# Patient Record
Sex: MALE | NOT HISPANIC OR LATINO | URBAN - METROPOLITAN AREA
[De-identification: names, ages, dates, MRNs, and addresses within clinical notes are randomized per-mention and may not be internally consistent; named-entity substitution may affect disease eponyms.]

---

## 2020-06-01 ENCOUNTER — OFFICE VISIT (OUTPATIENT)
Dept: URBAN - METROPOLITAN AREA CLINIC 39 | Facility: CLINIC | Age: 35
End: 2020-06-01
Payer: COMMERCIAL

## 2020-06-01 DIAGNOSIS — K50.80 CROHN'S DISEASE OF BOTH SMALL AND LARGE INTESTINE: ICD-10-CM

## 2020-06-01 PROCEDURE — 96413 CHEMO IV INFUSION 1 HR: CPT | Performed by: INTERNAL MEDICINE

## 2020-06-01 RX ORDER — VEDOLIZUMAB 300 MG/5ML
INFUSE 300 MG OVER 30 MINUTE(S) BY INTRAVENOUS ROUTE EVERY 8 WEEKS INJECTION, POWDER, LYOPHILIZED, FOR SOLUTION INTRAVENOUS
Qty: 1 | Refills: 0 | Status: ACTIVE | COMMUNITY
Start: 1900-01-01 | End: 1900-01-01

## 2020-07-28 ENCOUNTER — OFFICE VISIT (OUTPATIENT)
Dept: URBAN - METROPOLITAN AREA CLINIC 39 | Facility: CLINIC | Age: 35
End: 2020-07-28

## 2020-08-04 ENCOUNTER — OFFICE VISIT (OUTPATIENT)
Dept: URBAN - METROPOLITAN AREA CLINIC 97 | Facility: CLINIC | Age: 35
End: 2020-08-04

## 2020-08-04 ENCOUNTER — OFFICE VISIT (OUTPATIENT)
Dept: URBAN - METROPOLITAN AREA TELEHEALTH 2 | Facility: TELEHEALTH | Age: 35
End: 2020-08-04
Payer: COMMERCIAL

## 2020-08-04 DIAGNOSIS — R10.84 ABDOMINAL PAIN, GENERALIZED: ICD-10-CM

## 2020-08-04 DIAGNOSIS — K50.111 CROHN'S DISEASE OF COLON WITH RECTAL BLEEDING: ICD-10-CM

## 2020-08-04 DIAGNOSIS — R11.2 NAUSEA & VOMITING: ICD-10-CM

## 2020-08-04 DIAGNOSIS — U07.1 COVID-19: ICD-10-CM

## 2020-08-04 PROCEDURE — 99213 OFFICE O/P EST LOW 20 MIN: CPT | Performed by: INTERNAL MEDICINE

## 2020-08-04 PROCEDURE — G8427 DOCREV CUR MEDS BY ELIG CLIN: HCPCS | Performed by: INTERNAL MEDICINE

## 2020-08-04 PROCEDURE — G8417 CALC BMI ABV UP PARAM F/U: HCPCS | Performed by: INTERNAL MEDICINE

## 2020-08-04 PROCEDURE — 1036F TOBACCO NON-USER: CPT | Performed by: INTERNAL MEDICINE

## 2020-08-04 PROCEDURE — G9903 PT SCRN TBCO ID AS NON USER: HCPCS | Performed by: INTERNAL MEDICINE

## 2020-08-04 RX ORDER — VEDOLIZUMAB 300 MG/5ML
INFUSE 300 MG OVER 30 MINUTE(S) BY INTRAVENOUS ROUTE EVERY 8 WEEKS INJECTION, POWDER, LYOPHILIZED, FOR SOLUTION INTRAVENOUS
Qty: 1 | Refills: 0 | Status: ACTIVE | COMMUNITY
Start: 1900-01-01

## 2020-08-04 NOTE — HPI-TODAY'S VISIT:
This is a follow-up appointment for this patient, a 34 year old /Black male, after a previous visit few months ago for an evaluation for Crohn's disease. Pt has stopped Humira for cost reasons months ago.He is currenty on Entyvio. Hs been on it for 3.5 months. doing better now. occ cramping. one formed stool/day. no rectal bleeding. Mild joint pain. He eats less fast food daily (). stopped advil.   had moderate inflammation on colonoscopy last year. labs in oct, CRP-was 20. hb was 9.9. CMP-normal. on integra. stopped mesalamine since starting entyvio. He has had recent blood work a week ago. result is not back yet. Was diagnosed with Covid a week ago after being severely fatigued. No respiratory issues. should be able to resume entyvio after quarantine period. This was a telephone visit due to Covid 19

## 2020-11-03 ENCOUNTER — OFFICE VISIT (OUTPATIENT)
Dept: URBAN - METROPOLITAN AREA CLINIC 97 | Facility: CLINIC | Age: 35
End: 2020-11-03

## 2020-12-07 ENCOUNTER — WEB ENCOUNTER (OUTPATIENT)
Dept: URBAN - METROPOLITAN AREA CLINIC 74 | Facility: CLINIC | Age: 35
End: 2020-12-07

## 2020-12-07 ENCOUNTER — OFFICE VISIT (OUTPATIENT)
Dept: URBAN - METROPOLITAN AREA CLINIC 74 | Facility: CLINIC | Age: 35
End: 2020-12-07
Payer: COMMERCIAL

## 2020-12-07 ENCOUNTER — TELEPHONE ENCOUNTER (OUTPATIENT)
Dept: URBAN - METROPOLITAN AREA CLINIC 92 | Facility: CLINIC | Age: 35
End: 2020-12-07

## 2020-12-07 ENCOUNTER — LAB OUTSIDE AN ENCOUNTER (OUTPATIENT)
Dept: URBAN - METROPOLITAN AREA CLINIC 40 | Facility: CLINIC | Age: 35
End: 2020-12-07

## 2020-12-07 DIAGNOSIS — R11.2 NAUSEA & VOMITING: ICD-10-CM

## 2020-12-07 DIAGNOSIS — K50.111 CROHN'S DISEASE OF COLON WITH RECTAL BLEEDING: ICD-10-CM

## 2020-12-07 DIAGNOSIS — R10.84 ABDOMINAL CRAMPING, GENERALIZED: ICD-10-CM

## 2020-12-07 DIAGNOSIS — U07.1 COVID-19: ICD-10-CM

## 2020-12-07 PROCEDURE — 1036F TOBACCO NON-USER: CPT | Performed by: INTERNAL MEDICINE

## 2020-12-07 PROCEDURE — G8417 CALC BMI ABV UP PARAM F/U: HCPCS | Performed by: INTERNAL MEDICINE

## 2020-12-07 PROCEDURE — G8482 FLU IMMUNIZE ORDER/ADMIN: HCPCS | Performed by: INTERNAL MEDICINE

## 2020-12-07 PROCEDURE — G9903 PT SCRN TBCO ID AS NON USER: HCPCS | Performed by: INTERNAL MEDICINE

## 2020-12-07 PROCEDURE — 99214 OFFICE O/P EST MOD 30 MIN: CPT | Performed by: INTERNAL MEDICINE

## 2020-12-07 RX ORDER — VEDOLIZUMAB 300 MG/5ML
INFUSE 300 MG OVER 30 MINUTE(S) BY INTRAVENOUS ROUTE EVERY 8 WEEKS INJECTION, POWDER, LYOPHILIZED, FOR SOLUTION INTRAVENOUS
Qty: 1 | Refills: 0 | Status: ACTIVE | COMMUNITY
Start: 1900-01-01

## 2020-12-07 NOTE — HPI-TODAY'S VISIT:
This is a follow-up appointment for this patient, a 34 year old /Black male, after a previous visit few months ago for an evaluation for Crohn's disease. Pt has stopped Humira for cost reasons months ago.He is currenty on Entyvio. has not taken it for 4 months due to non-compliance/covid issues. doing better now. occ cramping. one formed stool/day. no rectal bleeding. Mild joint pain. He eats less fast food daily (). stopped advil.   had moderate inflammation on colonoscopy last year. recent labs-mild anemia. on iron. stopped mesalamine since starting entyvio. Was diagnosed with Covid in July after being severely fatigued. No respiratory issues. has not resumes entyvio since then

## 2020-12-14 ENCOUNTER — LAB OUTSIDE AN ENCOUNTER (OUTPATIENT)
Dept: URBAN - METROPOLITAN AREA CLINIC 74 | Facility: CLINIC | Age: 35
End: 2020-12-14

## 2020-12-14 ENCOUNTER — LAB OUTSIDE AN ENCOUNTER (OUTPATIENT)
Dept: URBAN - METROPOLITAN AREA CLINIC 92 | Facility: CLINIC | Age: 35
End: 2020-12-14

## 2020-12-23 LAB
A/G RATIO: 1.1
ALBUMIN: 4.2
ALKALINE PHOSPHATASE: 78
ALT (SGPT): 11
ANTI-VEDOLIZUMAB ANTIBODY: 30
AST (SGOT): 13
BILIRUBIN, TOTAL: <0.2
BUN/CREATININE RATIO: 12
BUN: 10
C-REACTIVE PROTEIN, QUANT: 5
CALCIUM: 9.7
CARBON DIOXIDE, TOTAL: 25
CHLORIDE: 101
CREATININE: 0.83
EGFR IF AFRICN AM: 131
EGFR IF NONAFRICN AM: 113
GLOBULIN, TOTAL: 3.8
GLUCOSE: 85
HEMATOCRIT: 38.8
HEMOGLOBIN: 12.2
IRON BIND.CAP.(TIBC): 384
IRON SATURATION: 10
IRON: 38
MCH: 24.2
MCHC: 31.4
MCV: 77
NRBC: (no result)
PLATELETS: 353
POTASSIUM: 4.3
PROTEIN, TOTAL: 8
RBC: 5.04
RDW: 19.8
SODIUM: 139
UIBC: 346
VEDOLIZUMAB: <1.3
VITAMIN D, 25-HYDROXY: 11.8
WBC: 6.2

## 2020-12-29 ENCOUNTER — TELEPHONE ENCOUNTER (OUTPATIENT)
Dept: URBAN - METROPOLITAN AREA CLINIC 98 | Facility: CLINIC | Age: 35
End: 2020-12-29

## 2020-12-30 ENCOUNTER — OFFICE VISIT (OUTPATIENT)
Dept: URBAN - METROPOLITAN AREA CLINIC 39 | Facility: CLINIC | Age: 35
End: 2020-12-30
Payer: COMMERCIAL

## 2020-12-30 VITALS
BODY MASS INDEX: 31.94 KG/M2 | DIASTOLIC BLOOD PRESSURE: 96 MMHG | WEIGHT: 241 LBS | TEMPERATURE: 98.5 F | RESPIRATION RATE: 18 BRPM | HEIGHT: 73 IN | SYSTOLIC BLOOD PRESSURE: 135 MMHG

## 2020-12-30 DIAGNOSIS — K50.80 CROHN'S COLITIS: ICD-10-CM

## 2020-12-30 PROCEDURE — 96413 CHEMO IV INFUSION 1 HR: CPT | Performed by: INTERNAL MEDICINE

## 2020-12-30 RX ORDER — VEDOLIZUMAB 300 MG/5ML
INFUSE 300 MG OVER 30 MINUTE(S) BY INTRAVENOUS ROUTE EVERY 8 WEEKS INJECTION, POWDER, LYOPHILIZED, FOR SOLUTION INTRAVENOUS
Qty: 1 | Refills: 0 | Status: ACTIVE | COMMUNITY
Start: 1900-01-01

## 2021-01-13 ENCOUNTER — OFFICE VISIT (OUTPATIENT)
Dept: URBAN - METROPOLITAN AREA CLINIC 39 | Facility: CLINIC | Age: 36
End: 2021-01-13
Payer: COMMERCIAL

## 2021-01-13 VITALS
TEMPERATURE: 97.2 F | SYSTOLIC BLOOD PRESSURE: 148 MMHG | WEIGHT: 241 LBS | HEIGHT: 73 IN | HEART RATE: 65 BPM | DIASTOLIC BLOOD PRESSURE: 96 MMHG | BODY MASS INDEX: 31.94 KG/M2 | RESPIRATION RATE: 18 BRPM

## 2021-01-13 DIAGNOSIS — K50.80 CROHN'S COLITIS: ICD-10-CM

## 2021-01-13 PROCEDURE — 96375 TX/PRO/DX INJ NEW DRUG ADDON: CPT | Performed by: INTERNAL MEDICINE

## 2021-01-13 PROCEDURE — 96365 THER/PROPH/DIAG IV INF INIT: CPT | Performed by: INTERNAL MEDICINE

## 2021-01-13 RX ORDER — VEDOLIZUMAB 300 MG/5ML
INFUSE 300 MG OVER 30 MINUTE(S) BY INTRAVENOUS ROUTE EVERY 8 WEEKS INJECTION, POWDER, LYOPHILIZED, FOR SOLUTION INTRAVENOUS
Qty: 1 | Refills: 0 | Status: ACTIVE | COMMUNITY
Start: 1900-01-01

## 2021-02-10 ENCOUNTER — OFFICE VISIT (OUTPATIENT)
Dept: URBAN - METROPOLITAN AREA CLINIC 39 | Facility: CLINIC | Age: 36
End: 2021-02-10
Payer: COMMERCIAL

## 2021-02-10 VITALS
RESPIRATION RATE: 18 BRPM | SYSTOLIC BLOOD PRESSURE: 132 MMHG | DIASTOLIC BLOOD PRESSURE: 98 MMHG | BODY MASS INDEX: 32.87 KG/M2 | HEIGHT: 73 IN | TEMPERATURE: 97.9 F | WEIGHT: 248 LBS | HEART RATE: 53 BPM

## 2021-02-10 DIAGNOSIS — K50.80 CROHN'S COLITIS: ICD-10-CM

## 2021-02-10 PROCEDURE — 96365 THER/PROPH/DIAG IV INF INIT: CPT | Performed by: INTERNAL MEDICINE

## 2021-02-10 PROCEDURE — 96375 TX/PRO/DX INJ NEW DRUG ADDON: CPT | Performed by: INTERNAL MEDICINE

## 2021-02-10 RX ORDER — VEDOLIZUMAB 300 MG/5ML
INFUSE 300 MG OVER 30 MINUTE(S) BY INTRAVENOUS ROUTE EVERY 8 WEEKS INJECTION, POWDER, LYOPHILIZED, FOR SOLUTION INTRAVENOUS
Qty: 1 | Refills: 0 | Status: ACTIVE | COMMUNITY
Start: 1900-01-01

## 2021-02-15 ENCOUNTER — OFFICE VISIT (OUTPATIENT)
Dept: URBAN - METROPOLITAN AREA CLINIC 74 | Facility: CLINIC | Age: 36
End: 2021-02-15

## 2021-02-16 ENCOUNTER — TELEPHONE ENCOUNTER (OUTPATIENT)
Dept: URBAN - METROPOLITAN AREA CLINIC 92 | Facility: CLINIC | Age: 36
End: 2021-02-16

## 2021-02-18 ENCOUNTER — OFFICE VISIT (OUTPATIENT)
Dept: URBAN - METROPOLITAN AREA CLINIC 74 | Facility: CLINIC | Age: 36
End: 2021-02-18

## 2021-02-18 RX ORDER — VEDOLIZUMAB 300 MG/5ML
INFUSE 300 MG OVER 30 MINUTE(S) BY INTRAVENOUS ROUTE EVERY 8 WEEKS INJECTION, POWDER, LYOPHILIZED, FOR SOLUTION INTRAVENOUS
Qty: 1 | Refills: 0 | Status: ACTIVE | COMMUNITY
Start: 1900-01-01

## 2021-02-24 ENCOUNTER — OFFICE VISIT (OUTPATIENT)
Dept: URBAN - METROPOLITAN AREA CLINIC 39 | Facility: CLINIC | Age: 36
End: 2021-02-24

## 2021-03-09 ENCOUNTER — OFFICE VISIT (OUTPATIENT)
Dept: URBAN - METROPOLITAN AREA TELEHEALTH 2 | Facility: TELEHEALTH | Age: 36
End: 2021-03-09
Payer: COMMERCIAL

## 2021-03-09 ENCOUNTER — LAB OUTSIDE AN ENCOUNTER (OUTPATIENT)
Dept: URBAN - METROPOLITAN AREA TELEHEALTH 2 | Facility: TELEHEALTH | Age: 36
End: 2021-03-09

## 2021-03-09 ENCOUNTER — TELEPHONE ENCOUNTER (OUTPATIENT)
Dept: URBAN - METROPOLITAN AREA CLINIC 23 | Facility: CLINIC | Age: 36
End: 2021-03-09

## 2021-03-09 DIAGNOSIS — K50.111 CROHN'S DISEASE OF COLON WITH RECTAL BLEEDING: ICD-10-CM

## 2021-03-09 DIAGNOSIS — R11.2 NAUSEA & VOMITING: ICD-10-CM

## 2021-03-09 DIAGNOSIS — E55.9 VITAMIN D DEFICIENCY: ICD-10-CM

## 2021-03-09 DIAGNOSIS — R10.84 ABDOMINAL CRAMPING, GENERALIZED: ICD-10-CM

## 2021-03-09 PROBLEM — 840539006: Status: ACTIVE | Noted: 2020-08-04

## 2021-03-09 PROBLEM — 34713006: Status: ACTIVE | Noted: 2021-03-09

## 2021-03-09 PROCEDURE — 99214 OFFICE O/P EST MOD 30 MIN: CPT | Performed by: INTERNAL MEDICINE

## 2021-03-09 RX ORDER — VEDOLIZUMAB 300 MG/5ML
INFUSE 300 MG OVER 30 MINUTE(S) BY INTRAVENOUS ROUTE EVERY 8 WEEKS INJECTION, POWDER, LYOPHILIZED, FOR SOLUTION INTRAVENOUS
Qty: 1 | Refills: 0 | Status: ACTIVE | COMMUNITY
Start: 1900-01-01

## 2021-03-09 RX ORDER — SODIUM, POTASSIUM,MAG SULFATES 17.5-3.13G
354ML SOLUTION, RECONSTITUTED, ORAL ORAL
Qty: 354 MILLILITER | Refills: 0 | OUTPATIENT
Start: 2021-03-09 | End: 2021-03-10

## 2021-03-09 RX ORDER — CHOLECALCIFEROL TAB 50 MCG (2000 UNIT) 50 MCG
ONCE PER WEEK TAB ORAL
Status: ACTIVE | COMMUNITY

## 2021-03-09 RX ORDER — CHOLECALCIFEROL TAB 50 MCG (2000 UNIT) 50 MCG
ONCE PER WEEK TAB ORAL
Qty: 8 TABLET | Refills: 4
End: 2021-04-18

## 2021-03-09 RX ORDER — CHOLECALCIFEROL TAB 50 MCG (2000 UNIT) 50 MCG
ONCE PER WEEK TAB ORAL
OUTPATIENT
End: 2021-04-18

## 2021-03-09 NOTE — HPI-TODAY'S VISIT:
This is a follow-up appointment for this patient, a 35 year old /Black male, after a previous visit few months ago for an evaluation for Crohn's disease. Pt has stopped Humira for cost reasons months ago.He is currenty on Entyvio. has not taken it for 4 months due to non-compliance/covid issues. started back few months ago. doing better now. occ cramping. one formed stool/day. no rectal bleeding. joint pain has resolved. He eats less fast food daily (). stopped advil.   had moderate inflammation on colonoscopy two years ago. recent labs-mild anemia. on iron. stopped mesalamine since starting entyvio. Was diagnosed with Covid in July after being severely fatigued. No respiratory issues. labs from dec. hb 12. iron was low. vit D-11.8. on vit D supplementations. will refill

## 2021-03-12 ENCOUNTER — TELEPHONE ENCOUNTER (OUTPATIENT)
Dept: URBAN - METROPOLITAN AREA CLINIC 92 | Facility: CLINIC | Age: 36
End: 2021-03-12

## 2021-04-07 ENCOUNTER — OFFICE VISIT (OUTPATIENT)
Dept: URBAN - METROPOLITAN AREA CLINIC 73 | Facility: CLINIC | Age: 36
End: 2021-04-07
Payer: COMMERCIAL

## 2021-04-07 VITALS
RESPIRATION RATE: 18 BRPM | BODY MASS INDEX: 33.93 KG/M2 | HEIGHT: 73 IN | TEMPERATURE: 98.8 F | DIASTOLIC BLOOD PRESSURE: 103 MMHG | HEART RATE: 60 BPM | SYSTOLIC BLOOD PRESSURE: 173 MMHG | WEIGHT: 256 LBS

## 2021-04-07 DIAGNOSIS — K50.80 CROHN'S COLITIS: ICD-10-CM

## 2021-04-07 PROCEDURE — 96375 TX/PRO/DX INJ NEW DRUG ADDON: CPT | Performed by: INTERNAL MEDICINE

## 2021-04-07 PROCEDURE — 96365 THER/PROPH/DIAG IV INF INIT: CPT | Performed by: INTERNAL MEDICINE

## 2021-04-07 RX ORDER — VEDOLIZUMAB 300 MG/5ML
INFUSE 300 MG OVER 30 MINUTE(S) BY INTRAVENOUS ROUTE EVERY 8 WEEKS INJECTION, POWDER, LYOPHILIZED, FOR SOLUTION INTRAVENOUS
Qty: 1 | Refills: 0 | Status: ACTIVE | COMMUNITY
Start: 1900-01-01

## 2021-04-07 RX ORDER — CHOLECALCIFEROL TAB 50 MCG (2000 UNIT) 50 MCG
ONCE PER WEEK TAB ORAL
Status: ACTIVE | COMMUNITY
End: 2021-04-18

## 2021-05-26 ENCOUNTER — OFFICE VISIT (OUTPATIENT)
Dept: URBAN - METROPOLITAN AREA SURGERY CENTER 30 | Facility: SURGERY CENTER | Age: 36
End: 2021-05-26
Payer: COMMERCIAL

## 2021-05-26 ENCOUNTER — LAB OUTSIDE AN ENCOUNTER (OUTPATIENT)
Dept: URBAN - METROPOLITAN AREA CLINIC 92 | Facility: CLINIC | Age: 36
End: 2021-05-26

## 2021-05-26 ENCOUNTER — TELEPHONE ENCOUNTER (OUTPATIENT)
Dept: URBAN - METROPOLITAN AREA CLINIC 92 | Facility: CLINIC | Age: 36
End: 2021-05-26

## 2021-05-26 DIAGNOSIS — Z53.8 FAILED ATTEMPTED SURGICAL PROCEDURE: ICD-10-CM

## 2021-05-26 DIAGNOSIS — K50.112 CROHN'S COLITIS, WITH INTESTINAL OBSTRUCTION: ICD-10-CM

## 2021-05-26 PROCEDURE — 45380 COLONOSCOPY AND BIOPSY: CPT | Performed by: INTERNAL MEDICINE

## 2021-05-26 PROCEDURE — G8907 PT DOC NO EVENTS ON DISCHARG: HCPCS | Performed by: INTERNAL MEDICINE

## 2021-05-26 RX ORDER — VEDOLIZUMAB 300 MG/5ML
INFUSE 300 MG OVER 30 MINUTE(S) BY INTRAVENOUS ROUTE EVERY 8 WEEKS INJECTION, POWDER, LYOPHILIZED, FOR SOLUTION INTRAVENOUS
Qty: 1 | Refills: 0 | Status: ACTIVE | COMMUNITY
Start: 1900-01-01

## 2021-05-27 LAB
A/G RATIO: 1.2
ALBUMIN: 4.5
ALKALINE PHOSPHATASE: 87
ALT (SGPT): 8
AST (SGOT): 10
BASO (ABSOLUTE): 0
BASOS: 0
BILIRUBIN, TOTAL: 0.4
BUN/CREATININE RATIO: 12
BUN: 11
CALCIUM: 9.5
CARBON DIOXIDE, TOTAL: 25
CHLORIDE: 100
CREATININE: 0.92
EGFR IF AFRICN AM: 123
EGFR IF NONAFRICN AM: 107
EOS (ABSOLUTE): 0.1
EOS: 1
FERRITIN, SERUM: 25
GLOBULIN, TOTAL: 3.7
GLUCOSE: 81
HEMATOCRIT: 39.8
HEMATOLOGY COMMENTS:: (no result)
HEMOGLOBIN: 12.6
IMMATURE CELLS: (no result)
IMMATURE GRANS (ABS): 0
IMMATURE GRANULOCYTES: 0
IRON BIND.CAP.(TIBC): 335
IRON SATURATION: 21
IRON: 72
LYMPHS (ABSOLUTE): 1.7
LYMPHS: 21
MCH: 25.1
MCHC: 31.7
MCV: 79
MONOCYTES(ABSOLUTE): 0.7
MONOCYTES: 9
NEUTROPHILS (ABSOLUTE): 5.8
NEUTROPHILS: 69
NRBC: (no result)
PLATELETS: 359
POTASSIUM: 4.7
PROTEIN, TOTAL: 8.2
RBC: 5.01
RDW: 15.2
SODIUM: 139
UIBC: 263
WBC: 8.4

## 2021-06-02 ENCOUNTER — TELEPHONE ENCOUNTER (OUTPATIENT)
Dept: URBAN - METROPOLITAN AREA CLINIC 98 | Facility: CLINIC | Age: 36
End: 2021-06-02

## 2021-06-02 ENCOUNTER — OFFICE VISIT (OUTPATIENT)
Dept: URBAN - METROPOLITAN AREA CLINIC 73 | Facility: CLINIC | Age: 36
End: 2021-06-02

## 2021-06-02 RX ORDER — VEDOLIZUMAB 300 MG/5ML
INFUSE 300 MG OVER 30 MINUTE(S) BY INTRAVENOUS ROUTE EVERY 8 WEEKS INJECTION, POWDER, LYOPHILIZED, FOR SOLUTION INTRAVENOUS
Qty: 1 | Refills: 0 | Status: ACTIVE | COMMUNITY
Start: 1900-01-01

## 2021-06-03 ENCOUNTER — TELEPHONE ENCOUNTER (OUTPATIENT)
Dept: URBAN - METROPOLITAN AREA CLINIC 98 | Facility: CLINIC | Age: 36
End: 2021-06-03

## 2021-06-10 ENCOUNTER — TELEPHONE ENCOUNTER (OUTPATIENT)
Dept: URBAN - METROPOLITAN AREA CLINIC 98 | Facility: CLINIC | Age: 36
End: 2021-06-10

## 2021-06-18 ENCOUNTER — TELEPHONE ENCOUNTER (OUTPATIENT)
Dept: URBAN - METROPOLITAN AREA CLINIC 74 | Facility: CLINIC | Age: 36
End: 2021-06-18

## 2021-06-21 ENCOUNTER — OFFICE VISIT (OUTPATIENT)
Dept: URBAN - METROPOLITAN AREA TELEHEALTH 2 | Facility: TELEHEALTH | Age: 36
End: 2021-06-21
Payer: COMMERCIAL

## 2021-06-21 DIAGNOSIS — R10.84 GENERALIZED ABDOMINAL PAIN: ICD-10-CM

## 2021-06-21 DIAGNOSIS — K50.111 CROHN'S DISEASE OF COLON WITH RECTAL BLEEDING: ICD-10-CM

## 2021-06-21 DIAGNOSIS — K56.699 STENOSIS OF COLON: ICD-10-CM

## 2021-06-21 PROCEDURE — 99215 OFFICE O/P EST HI 40 MIN: CPT | Performed by: INTERNAL MEDICINE

## 2021-06-21 RX ORDER — METRONIDAZOLE 500 MG/1
1 TABLET TABLET, FILM COATED ORAL THREE TIMES A DAY
Qty: 21 TABLET | Refills: 1 | OUTPATIENT
Start: 2021-06-21 | End: 2021-07-05

## 2021-06-21 RX ORDER — VEDOLIZUMAB 300 MG/5ML
INFUSE 300 MG OVER 30 MINUTE(S) BY INTRAVENOUS ROUTE EVERY 8 WEEKS INJECTION, POWDER, LYOPHILIZED, FOR SOLUTION INTRAVENOUS
Qty: 1 | Refills: 0 | Status: ACTIVE | COMMUNITY
Start: 1900-01-01

## 2021-06-21 RX ORDER — CIPROFLOXACIN HYDROCHLORIDE 500 MG/1
1 TABLET TABLET, FILM COATED ORAL
Qty: 14 TABLET | Refills: 1 | OUTPATIENT
Start: 2021-06-21 | End: 2021-07-05

## 2021-06-21 NOTE — HPI-TODAY'S VISIT:
This is a follow-up appointment for this patient, a 35 year old /Black male, after a previous visit few months ago for an evaluation for Crohn's disease. Pt has stopped Humira for cost reasons initially and then due to non-response 2 years ago. He is currenty on Entyvio. has not taken it for 4 months due to non-compliance/covid issues last year. started back few months ago. doing better now. occ cramping. one formed stool/day. no rectal bleeding. joint pain has resolved. He eats less fast food daily (). stopped advil.   had moderate inflammation on colonoscopy two years ago. recent labs-mild anemia. on iron. stopped mesalamine since starting entyvio. Was diagnosed with Covid in  last July after being severely fatigued. No respiratory issues. labs from dec. hb 12. iron was low. vit D-11.8. on vit D supplementations. recent colonoscoy-inflammation and stenosis in the transverse colon. scope could not traverse past it . Bx from strictured area: No malignancy. showed active inflammation/ulceration. recent blood work. ferritin is slightly low. hb 12.6. CMP-normal. 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 normal BMs now. not constipated. no rectal bleeding. has abdominal pain in the lower abdomen

## 2021-06-23 PROBLEM — 19132000: Status: ACTIVE | Noted: 2021-05-26

## 2021-06-23 PROBLEM — 102614006: Status: ACTIVE | Noted: 2021-05-26

## 2021-06-28 ENCOUNTER — OFFICE VISIT (OUTPATIENT)
Dept: URBAN - METROPOLITAN AREA CLINIC 73 | Facility: CLINIC | Age: 36
End: 2021-06-28

## 2021-07-12 ENCOUNTER — OFFICE VISIT (OUTPATIENT)
Dept: URBAN - METROPOLITAN AREA CLINIC 73 | Facility: CLINIC | Age: 36
End: 2021-07-12
Payer: COMMERCIAL

## 2021-07-12 VITALS
WEIGHT: 255 LBS | TEMPERATURE: 98.4 F | HEIGHT: 73 IN | HEART RATE: 64 BPM | BODY MASS INDEX: 33.8 KG/M2 | RESPIRATION RATE: 18 BRPM | SYSTOLIC BLOOD PRESSURE: 141 MMHG | DIASTOLIC BLOOD PRESSURE: 84 MMHG

## 2021-07-12 DIAGNOSIS — K50.80 CROHN'S COLITIS: ICD-10-CM

## 2021-07-12 PROCEDURE — 96375 TX/PRO/DX INJ NEW DRUG ADDON: CPT | Performed by: INTERNAL MEDICINE

## 2021-07-12 PROCEDURE — 96365 THER/PROPH/DIAG IV INF INIT: CPT | Performed by: INTERNAL MEDICINE

## 2021-07-12 RX ORDER — VEDOLIZUMAB 300 MG/5ML
INFUSE 300 MG OVER 30 MINUTE(S) BY INTRAVENOUS ROUTE EVERY 8 WEEKS INJECTION, POWDER, LYOPHILIZED, FOR SOLUTION INTRAVENOUS
Qty: 1 | Refills: 0 | Status: ACTIVE | COMMUNITY
Start: 1900-01-01

## 2021-09-13 ENCOUNTER — OFFICE VISIT (OUTPATIENT)
Dept: URBAN - METROPOLITAN AREA CLINIC 73 | Facility: CLINIC | Age: 36
End: 2021-09-13
Payer: COMMERCIAL

## 2021-09-13 VITALS
HEIGHT: 73 IN | HEART RATE: 60 BPM | BODY MASS INDEX: 34.19 KG/M2 | WEIGHT: 258 LBS | RESPIRATION RATE: 18 BRPM | TEMPERATURE: 96.9 F | SYSTOLIC BLOOD PRESSURE: 178 MMHG | DIASTOLIC BLOOD PRESSURE: 101 MMHG

## 2021-09-13 DIAGNOSIS — K50.80 CROHN'S COLITIS: ICD-10-CM

## 2021-09-13 PROCEDURE — 96413 CHEMO IV INFUSION 1 HR: CPT | Performed by: INTERNAL MEDICINE

## 2021-09-13 RX ORDER — VEDOLIZUMAB 300 MG/5ML
INFUSE 300 MG OVER 30 MINUTE(S) BY INTRAVENOUS ROUTE EVERY 8 WEEKS INJECTION, POWDER, LYOPHILIZED, FOR SOLUTION INTRAVENOUS
Qty: 1 | Refills: 0 | Status: ACTIVE | COMMUNITY
Start: 1900-01-01

## 2021-09-14 ENCOUNTER — TELEPHONE ENCOUNTER (OUTPATIENT)
Dept: URBAN - METROPOLITAN AREA CLINIC 40 | Facility: CLINIC | Age: 36
End: 2021-09-14

## 2021-09-14 ENCOUNTER — TELEPHONE ENCOUNTER (OUTPATIENT)
Dept: URBAN - METROPOLITAN AREA CLINIC 98 | Facility: CLINIC | Age: 36
End: 2021-09-14

## 2021-10-05 ENCOUNTER — TELEPHONE ENCOUNTER (OUTPATIENT)
Dept: URBAN - METROPOLITAN AREA CLINIC 40 | Facility: CLINIC | Age: 36
End: 2021-10-05

## 2021-10-05 RX ORDER — METRONIDAZOLE 500 MG/1
1 TABLET TABLET, FILM COATED ORAL THREE TIMES A DAY
Qty: 15 TABLET | Refills: 0 | OUTPATIENT
Start: 2021-10-05 | End: 2021-10-10

## 2021-10-05 RX ORDER — CIPROFLOXACIN HYDROCHLORIDE 500 MG/1
1 TABLET TABLET, FILM COATED ORAL
Qty: 10 TABLET | Refills: 0 | OUTPATIENT
Start: 2021-10-05 | End: 2021-10-10

## 2021-10-21 ENCOUNTER — OFFICE VISIT (OUTPATIENT)
Dept: URBAN - METROPOLITAN AREA TELEHEALTH 2 | Facility: TELEHEALTH | Age: 36
End: 2021-10-21
Payer: COMMERCIAL

## 2021-10-21 DIAGNOSIS — R10.84 ABDOMINAL CRAMPING, GENERALIZED: ICD-10-CM

## 2021-10-21 DIAGNOSIS — K50.80 CROHN'S COLITIS: ICD-10-CM

## 2021-10-21 DIAGNOSIS — R19.7 DIARRHEA: ICD-10-CM

## 2021-10-21 PROCEDURE — 99215 OFFICE O/P EST HI 40 MIN: CPT | Performed by: INTERNAL MEDICINE

## 2021-10-21 RX ORDER — FOLIC ACID 1 MG/1
1 TABLET TABLET ORAL ONCE A DAY
Qty: 30 | Refills: 11 | OUTPATIENT
Start: 2021-10-21 | End: 2022-10-16

## 2021-10-21 RX ORDER — METHOTREXATE SODIUM 2.5 MG/1
5 TAB TABLET ORAL WEEKLY
Qty: 20 | Refills: 4 | OUTPATIENT
Start: 2021-10-21

## 2021-10-21 RX ORDER — USTEKINUMAB 130 MG/26ML
AS DIRECTED SOLUTION INTRAVENOUS ONCE
Qty: 520 MILLIGRAMS | Refills: 0 | OUTPATIENT
Start: 2021-10-21 | End: 2021-10-22

## 2021-10-21 RX ORDER — USTEKINUMAB 90 MG/ML
AS DIRECTED INJECTION, SOLUTION SUBCUTANEOUS
Qty: 1 | Refills: 11 | OUTPATIENT
Start: 2021-10-21 | End: 2023-08-24

## 2021-10-21 RX ORDER — VEDOLIZUMAB 300 MG/5ML
INFUSE 300 MG OVER 30 MINUTE(S) BY INTRAVENOUS ROUTE EVERY 8 WEEKS INJECTION, POWDER, LYOPHILIZED, FOR SOLUTION INTRAVENOUS
Qty: 1 | Refills: 0 | Status: ACTIVE | COMMUNITY
Start: 1900-01-01

## 2021-10-21 NOTE — HPI-TODAY'S VISIT:
Tahir Aguayo is a 37 y/o indivi, with colonic CD, currently on Entyvio, here for evaluation of refractory dz. . Pt is referred by Dr. Desire Chowdary. . Pt was diagnosed with CD in 2019.  He initially started on Remicade/Humira (stopped due to cost), which did not work well for him.  He then started on entyvio, which has worked somwhat. . Pt on 10/21/2021, pt reports that he has up 2-3 BM per day.  No blood in the stool.  No regular nausea or vomiting, but once per month.  No abdominal pain.   .  Colonoscopy: 2021: The perianal and digital rectal examinations were normal. Findings: Non-bleeding internal hemorrhoids were found during retroflexion. The hemorrhoids were mild. Rectum : Hemorrhoids Rectum : Hemorrhoids A segmental area of mildly congested, erythematous, friable (with contact bleeding), inflamed, and ulcerated mucosa was found in the transverse colon. Biopsies were taken with a cold forceps for histology. Estimated blood loss was minimal A benign-appearing, intrinsic severe stenosis was found in the transverse colon and was non-traversed. stenotic area had congested, erythematous, friable (with contact bleeding), inflamed, and ulcerated appearing mucosa. Biopsies were taken with a cold forceps for histology to r/o malignancy. Estimated blood loss was minimal. A diffuse area of mildly congested, erythematous and pseudopolypoid mucosa was found in the descending colon. Biopsies were taken with a cold forceps for histology. Estimated blood loss was minimal. . A Transverse colon, biopsy Chronic, active colitis with ulceration. See microscopic description.  B Transverse colon, biopsy Consistent with quiescent inflammatory bowel disease. See microscopic description.  C Descending colon, biopsy Consistent with quiescent inflammatory bowel disease. See microscopic description.  2019: Skin tags were found on perianal exam. Findings: A segmental area of moderate to severe congested, erythematous, friable (with contact bleeding), inflamed, nodular, scarred and ulcerated mucosa was found in the descending and transverse colon. Biopsies were taken with a cold forceps for histology. Estimated blood loss was minimal. Biopsies were taken with a cold forceps in the rectum, in the descending colon, in the transverse colon and in the ascending colon for histology. Estimated blood loss was minimal. Non-bleeding internal hemorrhoids were found during retroflexion. The hemorrhoids were mild . A Right colon, biopsy Chronic inactive colitis.  B Transverse colon, biopsy Chronic active colitis with ulceration and granulation tissue.  C Descending colon, biopsy Ulcer and granulation tissue.  D Right colon, biopsy Colonic mucosa with no significant histopathology. No histologic evidence of active, chronic or microscopic colitis.  COMMENT: The changes seen in the colonic biopsies are compatible with the patient's clinical history of Crohn's disease. No dysplasia is identified in the above biopsies  had moderate inflammation on colonoscopy two years ago. recent labs-mild anemia. on iron. stopped mesalamine since starting entyvio. Was diagnosed with Covid in  last July after being severely fatigued. No respiratory issues. labs from dec. hb 12. iron was low. vit D-11.8. on vit D supplementations. recent colonoscoy-inflammation and stenosis in the transverse colon. scope could not traverse past it . Bx from strictured area: No malignancy. showed active inflammation/ulceration. recent blood work. ferritin is slightly low. hb 12.6. CMP-normal. 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 normal BMs now. not constipated. no rectal bleeding. has abdominal pain in the lower abdomen

## 2021-10-22 PROBLEM — 50440006: Status: ACTIVE | Noted: 2020-08-03

## 2021-10-24 ENCOUNTER — WEB ENCOUNTER (OUTPATIENT)
Dept: URBAN - METROPOLITAN AREA CLINIC 40 | Facility: CLINIC | Age: 36
End: 2021-10-24

## 2021-10-26 ENCOUNTER — TELEPHONE ENCOUNTER (OUTPATIENT)
Dept: URBAN - METROPOLITAN AREA SURGERY CENTER 30 | Facility: SURGERY CENTER | Age: 36
End: 2021-10-26

## 2021-11-03 ENCOUNTER — TELEPHONE ENCOUNTER (OUTPATIENT)
Dept: URBAN - METROPOLITAN AREA CLINIC 40 | Facility: CLINIC | Age: 36
End: 2021-11-03

## 2021-11-05 PROBLEM — 34000006 CROHN DISEASE: Status: ACTIVE | Noted: 2021-10-21

## 2021-12-07 ENCOUNTER — TELEPHONE ENCOUNTER (OUTPATIENT)
Dept: URBAN - METROPOLITAN AREA CLINIC 73 | Facility: CLINIC | Age: 36
End: 2021-12-07

## 2021-12-07 PROBLEM — 444548001: Status: ACTIVE | Noted: 2020-12-11

## 2021-12-08 ENCOUNTER — OFFICE VISIT (OUTPATIENT)
Dept: URBAN - METROPOLITAN AREA CLINIC 73 | Facility: CLINIC | Age: 36
End: 2021-12-08
Payer: COMMERCIAL

## 2021-12-08 VITALS
RESPIRATION RATE: 18 BRPM | WEIGHT: 264 LBS | HEART RATE: 63 BPM | SYSTOLIC BLOOD PRESSURE: 179 MMHG | TEMPERATURE: 97.6 F | DIASTOLIC BLOOD PRESSURE: 90 MMHG | HEIGHT: 73 IN | BODY MASS INDEX: 34.99 KG/M2

## 2021-12-08 DIAGNOSIS — K50.80 CROHN'S COLITIS: ICD-10-CM

## 2021-12-08 PROCEDURE — 96413 CHEMO IV INFUSION 1 HR: CPT | Performed by: INTERNAL MEDICINE

## 2021-12-08 RX ORDER — METHOTREXATE SODIUM 2.5 MG/1
5 TAB TABLET ORAL WEEKLY
Qty: 20 | Refills: 4 | Status: ACTIVE | COMMUNITY
Start: 2021-10-21

## 2021-12-08 RX ORDER — VEDOLIZUMAB 300 MG/5ML
INFUSE 300 MG OVER 30 MINUTE(S) BY INTRAVENOUS ROUTE EVERY 8 WEEKS INJECTION, POWDER, LYOPHILIZED, FOR SOLUTION INTRAVENOUS
Qty: 1 | Refills: 0 | Status: ACTIVE | COMMUNITY
Start: 1900-01-01

## 2021-12-08 RX ORDER — FOLIC ACID 1 MG/1
1 TABLET TABLET ORAL ONCE A DAY
Qty: 30 | Refills: 11 | Status: ACTIVE | COMMUNITY
Start: 2021-10-21 | End: 2022-10-16

## 2021-12-08 RX ORDER — USTEKINUMAB 90 MG/ML
AS DIRECTED INJECTION, SOLUTION SUBCUTANEOUS
Qty: 1 | Refills: 11 | Status: ACTIVE | COMMUNITY
Start: 2021-10-21 | End: 2023-08-24

## 2022-01-25 ENCOUNTER — TELEPHONE ENCOUNTER (OUTPATIENT)
Dept: URBAN - METROPOLITAN AREA CLINIC 40 | Facility: CLINIC | Age: 37
End: 2022-01-25

## 2023-03-13 ENCOUNTER — OFFICE VISIT (OUTPATIENT)
Dept: URBAN - METROPOLITAN AREA CLINIC 74 | Facility: CLINIC | Age: 38
End: 2023-03-13

## 2023-03-21 ENCOUNTER — OFFICE VISIT (OUTPATIENT)
Dept: URBAN - METROPOLITAN AREA CLINIC 40 | Facility: CLINIC | Age: 38
End: 2023-03-21

## 2023-08-08 ENCOUNTER — OFFICE VISIT (OUTPATIENT)
Dept: URBAN - METROPOLITAN AREA CLINIC 74 | Facility: CLINIC | Age: 38
End: 2023-08-08

## 2023-08-08 ENCOUNTER — DASHBOARD ENCOUNTERS (OUTPATIENT)
Age: 38
End: 2023-08-08

## 2023-08-08 RX ORDER — USTEKINUMAB 90 MG/ML
AS DIRECTED INJECTION, SOLUTION SUBCUTANEOUS
Qty: 1 | Refills: 11 | Status: ACTIVE | COMMUNITY
Start: 2021-10-21 | End: 2023-08-24

## 2023-08-08 RX ORDER — METHOTREXATE SODIUM 2.5 MG/1
5 TAB TABLET ORAL WEEKLY
Qty: 20 | Refills: 4 | OUTPATIENT

## 2023-08-08 RX ORDER — METHOTREXATE SODIUM 2.5 MG/1
5 TAB TABLET ORAL WEEKLY
Qty: 20 | Refills: 4 | Status: ACTIVE | COMMUNITY
Start: 2021-10-21

## 2023-08-08 RX ORDER — USTEKINUMAB 90 MG/ML
AS DIRECTED INJECTION, SOLUTION SUBCUTANEOUS
Qty: 1 | Refills: 11 | OUTPATIENT

## 2023-08-08 RX ORDER — VEDOLIZUMAB 300 MG/5ML
INFUSE 300 MG OVER 30 MINUTE(S) BY INTRAVENOUS ROUTE EVERY 8 WEEKS INJECTION, POWDER, LYOPHILIZED, FOR SOLUTION INTRAVENOUS
Qty: 1 | Refills: 0 | Status: ACTIVE | COMMUNITY
Start: 1900-01-01

## 2024-08-29 ENCOUNTER — OFFICE VISIT (OUTPATIENT)
Dept: URBAN - METROPOLITAN AREA CLINIC 40 | Facility: CLINIC | Age: 39
End: 2024-08-29

## 2024-08-29 RX ORDER — METHOTREXATE SODIUM 2.5 MG/1
5 TAB TABLET ORAL WEEKLY
Qty: 20 | Refills: 4 | Status: ACTIVE | COMMUNITY

## 2024-08-29 RX ORDER — USTEKINUMAB 90 MG/ML
AS DIRECTED INJECTION, SOLUTION SUBCUTANEOUS
Qty: 1 | Refills: 11 | Status: ACTIVE | COMMUNITY

## 2024-08-29 RX ORDER — VEDOLIZUMAB 300 MG/5ML
INFUSE 300 MG OVER 30 MINUTE(S) BY INTRAVENOUS ROUTE EVERY 8 WEEKS INJECTION, POWDER, LYOPHILIZED, FOR SOLUTION INTRAVENOUS
Qty: 1 | Refills: 0 | Status: ACTIVE | COMMUNITY
Start: 1900-01-01